# Patient Record
Sex: FEMALE | Race: ASIAN | NOT HISPANIC OR LATINO | Employment: UNEMPLOYED | URBAN - METROPOLITAN AREA
[De-identification: names, ages, dates, MRNs, and addresses within clinical notes are randomized per-mention and may not be internally consistent; named-entity substitution may affect disease eponyms.]

---

## 2021-02-25 PROBLEM — U07.1 COVID-19 VIRUS INFECTION: Status: ACTIVE | Noted: 2021-02-25

## 2021-02-28 PROBLEM — J96.01 ACUTE RESPIRATORY FAILURE WITH HYPOXIA: Status: ACTIVE | Noted: 2021-02-28

## 2021-02-28 PROBLEM — J12.82 PNEUMONIA DUE TO COVID-19 VIRUS: Status: ACTIVE | Noted: 2021-02-25

## 2021-02-28 PROBLEM — E87.6 HYPOKALEMIA: Status: ACTIVE | Noted: 2021-02-28

## 2021-03-02 DIAGNOSIS — U07.1 COVID-19 VIRUS DETECTED: ICD-10-CM

## 2021-03-03 ENCOUNTER — PATIENT OUTREACH (OUTPATIENT)
Dept: ADMINISTRATIVE | Facility: CLINIC | Age: 36
End: 2021-03-03

## 2021-03-17 ENCOUNTER — OFFICE VISIT (OUTPATIENT)
Dept: PRIMARY CARE CLINIC | Facility: CLINIC | Age: 36
End: 2021-03-17
Payer: COMMERCIAL

## 2021-03-17 VITALS
WEIGHT: 127.31 LBS | HEIGHT: 60 IN | BODY MASS INDEX: 25 KG/M2 | DIASTOLIC BLOOD PRESSURE: 70 MMHG | OXYGEN SATURATION: 97 % | HEART RATE: 115 BPM | SYSTOLIC BLOOD PRESSURE: 110 MMHG | TEMPERATURE: 99 F

## 2021-03-17 DIAGNOSIS — J12.82 PNEUMONIA DUE TO COVID-19 VIRUS: ICD-10-CM

## 2021-03-17 DIAGNOSIS — R00.0 TACHYCARDIA: ICD-10-CM

## 2021-03-17 DIAGNOSIS — J96.01 ACUTE RESPIRATORY FAILURE WITH HYPOXIA: Primary | ICD-10-CM

## 2021-03-17 DIAGNOSIS — U07.1 PNEUMONIA DUE TO COVID-19 VIRUS: ICD-10-CM

## 2021-03-17 PROCEDURE — 3008F BODY MASS INDEX DOCD: CPT | Mod: CPTII,S$GLB,, | Performed by: FAMILY MEDICINE

## 2021-03-17 PROCEDURE — 99213 OFFICE O/P EST LOW 20 MIN: CPT | Mod: S$GLB,,, | Performed by: FAMILY MEDICINE

## 2021-03-17 PROCEDURE — 99213 PR OFFICE/OUTPT VISIT, EST, LEVL III, 20-29 MIN: ICD-10-PCS | Mod: S$GLB,,, | Performed by: FAMILY MEDICINE

## 2021-03-17 PROCEDURE — 99999 PR PBB SHADOW E&M-EST. PATIENT-LVL III: ICD-10-PCS | Mod: PBBFAC,,, | Performed by: FAMILY MEDICINE

## 2021-03-17 PROCEDURE — 99999 PR PBB SHADOW E&M-EST. PATIENT-LVL III: CPT | Mod: PBBFAC,,, | Performed by: FAMILY MEDICINE

## 2021-03-17 PROCEDURE — 3008F PR BODY MASS INDEX (BMI) DOCUMENTED: ICD-10-PCS | Mod: CPTII,S$GLB,, | Performed by: FAMILY MEDICINE

## 2021-03-17 RX ORDER — RIBOFLAVIN (VITAMIN B2) 400 MG
400 TABLET ORAL DAILY
COMMUNITY
End: 2023-08-10

## 2021-03-17 RX ORDER — TOPIRAMATE 50 MG/1
50 TABLET, FILM COATED ORAL 2 TIMES DAILY
COMMUNITY
End: 2023-08-10

## 2021-03-17 RX ORDER — DROSPIRENONE AND ETHINYL ESTRADIOL 0.02-3(28)
1 KIT ORAL DAILY
COMMUNITY
End: 2023-08-10

## 2021-03-17 RX ORDER — LANOLIN ALCOHOL/MO/W.PET/CERES
400 CREAM (GRAM) TOPICAL 2 TIMES DAILY
COMMUNITY
End: 2023-08-10

## 2021-04-26 ENCOUNTER — PATIENT MESSAGE (OUTPATIENT)
Dept: RESEARCH | Facility: HOSPITAL | Age: 36
End: 2021-04-26

## 2021-08-25 DIAGNOSIS — Z11.59 NEED FOR HEPATITIS C SCREENING TEST: ICD-10-CM

## 2021-10-05 ENCOUNTER — PATIENT MESSAGE (OUTPATIENT)
Dept: ADMINISTRATIVE | Facility: HOSPITAL | Age: 36
End: 2021-10-05

## 2022-06-07 ENCOUNTER — PATIENT MESSAGE (OUTPATIENT)
Dept: PRIMARY CARE CLINIC | Facility: CLINIC | Age: 37
End: 2022-06-07
Payer: COMMERCIAL

## 2023-05-10 ENCOUNTER — PATIENT OUTREACH (OUTPATIENT)
Dept: ADMINISTRATIVE | Facility: HOSPITAL | Age: 38
End: 2023-05-10
Payer: COMMERCIAL

## 2023-08-10 ENCOUNTER — OFFICE VISIT (OUTPATIENT)
Dept: PRIMARY CARE CLINIC | Facility: CLINIC | Age: 38
End: 2023-08-10
Payer: COMMERCIAL

## 2023-08-10 ENCOUNTER — LAB VISIT (OUTPATIENT)
Dept: PRIMARY CARE CLINIC | Facility: CLINIC | Age: 38
End: 2023-08-10
Payer: COMMERCIAL

## 2023-08-10 VITALS
WEIGHT: 132.25 LBS | HEART RATE: 95 BPM | SYSTOLIC BLOOD PRESSURE: 115 MMHG | HEIGHT: 60 IN | OXYGEN SATURATION: 97 % | BODY MASS INDEX: 25.97 KG/M2 | DIASTOLIC BLOOD PRESSURE: 72 MMHG

## 2023-08-10 DIAGNOSIS — Z00.00 ANNUAL PHYSICAL EXAM: ICD-10-CM

## 2023-08-10 DIAGNOSIS — Z00.00 ANNUAL PHYSICAL EXAM: Primary | ICD-10-CM

## 2023-08-10 DIAGNOSIS — Z86.32 HISTORY OF GESTATIONAL DIABETES: ICD-10-CM

## 2023-08-10 LAB
ALBUMIN SERPL BCP-MCNC: 4.3 G/DL (ref 3.5–5.2)
ALP SERPL-CCNC: 69 U/L (ref 55–135)
ALT SERPL W/O P-5'-P-CCNC: 37 U/L (ref 10–44)
ANION GAP SERPL CALC-SCNC: 14 MMOL/L (ref 8–16)
AST SERPL-CCNC: 30 U/L (ref 10–40)
BASOPHILS # BLD AUTO: 0.04 K/UL (ref 0–0.2)
BASOPHILS NFR BLD: 0.7 % (ref 0–1.9)
BILIRUB SERPL-MCNC: 0.6 MG/DL (ref 0.1–1)
BUN SERPL-MCNC: 13 MG/DL (ref 6–20)
CALCIUM SERPL-MCNC: 10 MG/DL (ref 8.7–10.5)
CHLORIDE SERPL-SCNC: 102 MMOL/L (ref 95–110)
CHOLEST SERPL-MCNC: 219 MG/DL (ref 120–199)
CHOLEST/HDLC SERPL: 5.3 {RATIO} (ref 2–5)
CO2 SERPL-SCNC: 23 MMOL/L (ref 23–29)
CREAT SERPL-MCNC: 0.6 MG/DL (ref 0.5–1.4)
DIFFERENTIAL METHOD: NORMAL
EOSINOPHIL # BLD AUTO: 0.4 K/UL (ref 0–0.5)
EOSINOPHIL NFR BLD: 7.1 % (ref 0–8)
ERYTHROCYTE [DISTWIDTH] IN BLOOD BY AUTOMATED COUNT: 12.4 % (ref 11.5–14.5)
EST. GFR  (NO RACE VARIABLE): >60 ML/MIN/1.73 M^2
ESTIMATED AVG GLUCOSE: 157 MG/DL (ref 68–131)
GLUCOSE SERPL-MCNC: 134 MG/DL (ref 70–110)
HBA1C MFR BLD: 7.1 % (ref 4–5.6)
HCT VFR BLD AUTO: 41 % (ref 37–48.5)
HDLC SERPL-MCNC: 41 MG/DL (ref 40–75)
HDLC SERPL: 18.7 % (ref 20–50)
HGB BLD-MCNC: 13.8 G/DL (ref 12–16)
IMM GRANULOCYTES # BLD AUTO: 0.01 K/UL (ref 0–0.04)
IMM GRANULOCYTES NFR BLD AUTO: 0.2 % (ref 0–0.5)
LDLC SERPL CALC-MCNC: ABNORMAL MG/DL (ref 63–159)
LYMPHOCYTES # BLD AUTO: 2.1 K/UL (ref 1–4.8)
LYMPHOCYTES NFR BLD: 35 % (ref 18–48)
MCH RBC QN AUTO: 29.4 PG (ref 27–31)
MCHC RBC AUTO-ENTMCNC: 33.7 G/DL (ref 32–36)
MCV RBC AUTO: 87 FL (ref 82–98)
MONOCYTES # BLD AUTO: 0.5 K/UL (ref 0.3–1)
MONOCYTES NFR BLD: 8.6 % (ref 4–15)
NEUTROPHILS # BLD AUTO: 2.9 K/UL (ref 1.8–7.7)
NEUTROPHILS NFR BLD: 48.4 % (ref 38–73)
NONHDLC SERPL-MCNC: 178 MG/DL
NRBC BLD-RTO: 0 /100 WBC
PLATELET # BLD AUTO: 251 K/UL (ref 150–450)
PMV BLD AUTO: 10.2 FL (ref 9.2–12.9)
POTASSIUM SERPL-SCNC: 3.7 MMOL/L (ref 3.5–5.1)
PROT SERPL-MCNC: 8.3 G/DL (ref 6–8.4)
RBC # BLD AUTO: 4.7 M/UL (ref 4–5.4)
SODIUM SERPL-SCNC: 139 MMOL/L (ref 136–145)
TRIGL SERPL-MCNC: 905 MG/DL (ref 30–150)
WBC # BLD AUTO: 6.02 K/UL (ref 3.9–12.7)

## 2023-08-10 PROCEDURE — 99395 PR PREVENTIVE VISIT,EST,18-39: ICD-10-PCS | Mod: S$GLB,,, | Performed by: FAMILY MEDICINE

## 2023-08-10 PROCEDURE — 80061 LIPID PANEL: CPT | Performed by: FAMILY MEDICINE

## 2023-08-10 PROCEDURE — 99395 PREV VISIT EST AGE 18-39: CPT | Mod: S$GLB,,, | Performed by: FAMILY MEDICINE

## 2023-08-10 PROCEDURE — 1160F PR REVIEW ALL MEDS BY PRESCRIBER/CLIN PHARMACIST DOCUMENTED: ICD-10-PCS | Mod: CPTII,S$GLB,, | Performed by: FAMILY MEDICINE

## 2023-08-10 PROCEDURE — 3008F BODY MASS INDEX DOCD: CPT | Mod: CPTII,S$GLB,, | Performed by: FAMILY MEDICINE

## 2023-08-10 PROCEDURE — 3078F DIAST BP <80 MM HG: CPT | Mod: CPTII,S$GLB,, | Performed by: FAMILY MEDICINE

## 2023-08-10 PROCEDURE — 3074F SYST BP LT 130 MM HG: CPT | Mod: CPTII,S$GLB,, | Performed by: FAMILY MEDICINE

## 2023-08-10 PROCEDURE — 3078F PR MOST RECENT DIASTOLIC BLOOD PRESSURE < 80 MM HG: ICD-10-PCS | Mod: CPTII,S$GLB,, | Performed by: FAMILY MEDICINE

## 2023-08-10 PROCEDURE — 80053 COMPREHEN METABOLIC PANEL: CPT | Performed by: FAMILY MEDICINE

## 2023-08-10 PROCEDURE — 1159F PR MEDICATION LIST DOCUMENTED IN MEDICAL RECORD: ICD-10-PCS | Mod: CPTII,S$GLB,, | Performed by: FAMILY MEDICINE

## 2023-08-10 PROCEDURE — 85025 COMPLETE CBC W/AUTO DIFF WBC: CPT | Performed by: FAMILY MEDICINE

## 2023-08-10 PROCEDURE — 3008F PR BODY MASS INDEX (BMI) DOCUMENTED: ICD-10-PCS | Mod: CPTII,S$GLB,, | Performed by: FAMILY MEDICINE

## 2023-08-10 PROCEDURE — 99999 PR PBB SHADOW E&M-EST. PATIENT-LVL IV: CPT | Mod: PBBFAC,,, | Performed by: FAMILY MEDICINE

## 2023-08-10 PROCEDURE — 83036 HEMOGLOBIN GLYCOSYLATED A1C: CPT | Performed by: FAMILY MEDICINE

## 2023-08-10 PROCEDURE — 1159F MED LIST DOCD IN RCRD: CPT | Mod: CPTII,S$GLB,, | Performed by: FAMILY MEDICINE

## 2023-08-10 PROCEDURE — 3074F PR MOST RECENT SYSTOLIC BLOOD PRESSURE < 130 MM HG: ICD-10-PCS | Mod: CPTII,S$GLB,, | Performed by: FAMILY MEDICINE

## 2023-08-10 PROCEDURE — 1160F RVW MEDS BY RX/DR IN RCRD: CPT | Mod: CPTII,S$GLB,, | Performed by: FAMILY MEDICINE

## 2023-08-10 PROCEDURE — 99999 PR PBB SHADOW E&M-EST. PATIENT-LVL IV: ICD-10-PCS | Mod: PBBFAC,,, | Performed by: FAMILY MEDICINE

## 2023-08-10 RX ORDER — FERROUS SULFATE 324(65)MG
324 TABLET, DELAYED RELEASE (ENTERIC COATED) ORAL DAILY
COMMUNITY

## 2023-08-10 RX ORDER — CETIRIZINE HYDROCHLORIDE 10 MG/1
10 TABLET ORAL DAILY
COMMUNITY
Start: 2020-08-09

## 2023-08-10 RX ORDER — IBUPROFEN 600 MG/1
800 TABLET ORAL EVERY 6 HOURS
COMMUNITY
Start: 2023-06-16

## 2023-08-11 DIAGNOSIS — E78.1 HYPERTRIGLYCERIDEMIA: ICD-10-CM

## 2023-08-11 DIAGNOSIS — E11.9 TYPE 2 DIABETES MELLITUS WITHOUT COMPLICATION, WITHOUT LONG-TERM CURRENT USE OF INSULIN: Primary | ICD-10-CM

## 2023-08-11 RX ORDER — METFORMIN HYDROCHLORIDE 500 MG/1
500 TABLET, EXTENDED RELEASE ORAL
Qty: 90 TABLET | Refills: 3 | Status: SHIPPED | OUTPATIENT
Start: 2023-08-11

## 2025-05-07 ENCOUNTER — OFFICE VISIT (OUTPATIENT)
Dept: PRIMARY CARE CLINIC | Facility: CLINIC | Age: 40
End: 2025-05-07
Payer: COMMERCIAL

## 2025-05-07 VITALS
SYSTOLIC BLOOD PRESSURE: 165 MMHG | OXYGEN SATURATION: 97 % | BODY MASS INDEX: 26.53 KG/M2 | HEIGHT: 60 IN | WEIGHT: 135.13 LBS | DIASTOLIC BLOOD PRESSURE: 102 MMHG | HEART RATE: 90 BPM

## 2025-05-07 DIAGNOSIS — Z00.00 ANNUAL PHYSICAL EXAM: ICD-10-CM

## 2025-05-07 DIAGNOSIS — L70.0 ACNE VULGARIS: ICD-10-CM

## 2025-05-07 DIAGNOSIS — E11.9 TYPE 2 DIABETES MELLITUS WITHOUT COMPLICATION, WITHOUT LONG-TERM CURRENT USE OF INSULIN: Primary | ICD-10-CM

## 2025-05-07 PROCEDURE — 99999 PR PBB SHADOW E&M-EST. PATIENT-LVL IV: CPT | Mod: PBBFAC,,, | Performed by: FAMILY MEDICINE

## 2025-05-07 RX ORDER — INSULIN ASPART 100 [IU]/ML
INJECTION, SOLUTION INTRAVENOUS; SUBCUTANEOUS
COMMUNITY
End: 2025-05-07

## 2025-05-07 RX ORDER — ONDANSETRON 4 MG/1
8 TABLET, FILM COATED ORAL 2 TIMES DAILY
COMMUNITY

## 2025-05-07 RX ORDER — INSULIN GLARGINE 100 [IU]/ML
INJECTION, SOLUTION SUBCUTANEOUS
Qty: 15 ML | Refills: 3 | Status: SHIPPED | OUTPATIENT
Start: 2025-05-07

## 2025-05-07 RX ORDER — PEN NEEDLE, DIABETIC 30 GX3/16"
1 NEEDLE, DISPOSABLE MISCELLANEOUS
Qty: 100 EACH | Refills: 6 | Status: SHIPPED | OUTPATIENT
Start: 2025-05-07

## 2025-05-07 RX ORDER — INSULIN ASPART 100 [IU]/ML
INJECTION, SOLUTION INTRAVENOUS; SUBCUTANEOUS
Qty: 6 ML | Refills: 3 | Status: SHIPPED | OUTPATIENT
Start: 2025-05-07

## 2025-05-07 RX ORDER — BLOOD-GLUCOSE SENSOR
1 EACH MISCELLANEOUS
Qty: 5 EACH | Refills: 6 | Status: SHIPPED | OUTPATIENT
Start: 2025-05-07

## 2025-05-07 RX ORDER — KETOCONAZOLE 20 MG/G
CREAM TOPICAL DAILY
COMMUNITY

## 2025-05-07 RX ORDER — TAZAROTENE 0.5 MG/G
GEL TOPICAL NIGHTLY
Qty: 30 G | Refills: 1 | Status: SHIPPED | OUTPATIENT
Start: 2025-05-07 | End: 2026-05-07

## 2025-05-07 NOTE — PROGRESS NOTES
Clinic Note  5/7/2025      Subjective:       Patient ID:  Gisella is a 40 y.o. female being seen for:      History of Present Illness    CHIEF COMPLAINT:  Gisella presents today for annual checkup with headache from immunotherapy.    CANCER HISTORY:  She was diagnosed with liver cancer in 2023 after presenting to the ER with abdominal pain. She underwent tumor resection and cholecystectomy in 2024, followed by direct chemotherapy and microwave ablation which removed 11 tumors with 3 remaining. She initiated immunotherapy yesterday. The etiology of her liver cancer has been attributed to prolonged estrogen exposure from birth control use since age 18. She was advised to discontinue birth control pills and avoid future pregnancies due to estrogen-related health risks.    DIABETES:  She has been on insulin since June 2024, currently taking Lantus 15 units in the morning and 17 units at night. She uses pre-meal insulin starting at 5 units with 1 unit adjustments based on blood sugar. She uses a Dexcom continuous glucose monitor. She reports better glycemic control with meals consisting of steak and vegetables (broccoli, asparagus, mushrooms), while fruits cause significant blood sugar elevation.    DERMATOLOGIC:  She reports ongoing acne concerns with continued presence of pimples despite current treatment regimen.    SOCIAL HISTORY:  She has one child who is almost two years old.      ROS:  General: -fever, -chills, -fatigue, -weight gain, -weight loss  Eyes: -vision changes, -redness, -discharge  ENT: -ear pain, -nasal congestion, -sore throat  Cardiovascular: -chest pain, -palpitations, -lower extremity edema  Respiratory: -cough, -shortness of breath  Gastrointestinal: -abdominal pain, -nausea, -vomiting, -diarrhea, -constipation, -blood in stool  Genitourinary: -dysuria, -hematuria, -frequency  Musculoskeletal: -joint pain, -muscle pain  Skin: -rash, -lesion, +acne  Neurological: +headache, -dizziness, -numbness,  "-tingling  Psychiatric: -anxiety, -depression, -sleep difficulty  Head: +head pain           Medication List with Changes/Refills   New Medications    BLOOD-GLUCOSE SENSOR (DEXCOM G7 SENSOR) SHMUEL    1 each by Misc.(Non-Drug; Combo Route) route 4 (four) times daily before meals and nightly.    INSULIN ASPART U-100 (NOVOLOG FLEXPEN U-100 INSULIN) 100 UNIT/ML (3 ML) INPN PEN    Inject 6 units + sliding scale three times a day with meals    INSULIN GLARGINE U-100, LANTUS, (LANTUS SOLOSTAR U-100 INSULIN) 100 UNIT/ML (3 ML) INPN PEN    Take 18 units in the morning and 20 units at night    PEN NEEDLE, DIABETIC 31 GAUGE X 5/16" NDLE    1 each by Misc.(Non-Drug; Combo Route) route 5 (five) times daily.    TAZAROTENE (TAZORAC) 0.05 % GEL    Apply topically every evening.   Current Medications    CETIRIZINE (ZYRTEC) 10 MG TABLET    Take 10 mg by mouth once daily.    DIETARY SUPPLEMENT ORAL    1,200 mg. Sunflower Lecithin 1-4 daily    DIETARY SUPPLEMENT ORAL    Liquid gold for milk production 2 capsules, 3 times daily    FERROUS SULFATE 324 MG (65 MG IRON) TBEC    Take 324 mg by mouth once daily.    IBUPROFEN (ADVIL,MOTRIN) 600 MG TABLET    Take 800 mg by mouth every 6 (six) hours.    INV FOLATE 400 MCG TABLET    Take 400 mcg by mouth once daily.    KETOCONAZOLE (NIZORAL) 2 % CREAM    Apply topically once daily.    ONDANSETRON (ZOFRAN) 4 MG TABLET    Take 8 mg by mouth 2 (two) times daily.    PRENATAL VIT 91/IRON/FOLIC/DHA (PRENATAL + DHA ORAL)    Take 2 each by mouth once daily.   Discontinued Medications    INSULIN ASPART U-100 (NOVOLOG) 100 UNIT/ML INJECTION    Inject into the skin 3 (three) times daily before meals.    METFORMIN (GLUCOPHAGE-XR) 500 MG ER 24HR TABLET    Take 1 tablet (500 mg total) by mouth daily with breakfast.       Problem List[1]        Objective:      BP (!) 165/102   Pulse 90   Ht 5' (1.524 m)   Wt 61.3 kg (135 lb 2.3 oz)   LMP 04/29/2025   SpO2 97%   BMI 26.39 kg/m²       Physical Exam  "   Vitals: Blood pressure: 138/87.  General: No acute distress. Well-developed. Well-nourished.  Eyes: EOMI. Sclerae anicteric.  HENT: Normocephalic. Atraumatic. Nares patent. Moist oral mucosa.  Cardiovascular: Regular rate. Regular rhythm. No murmurs. No rubs. No gallops. Normal S1, S2.  Respiratory: Normal respiratory effort. Clear to auscultation bilaterally. No rales. No rhonchi. No wheezing.  Musculoskeletal: No  obvious deformity.  Extremities: No lower extremity edema.  Neurological: Alert & oriented x3. No slurred speech. Normal gait.  Psychiatric: Normal mood. Normal affect. Good insight. Good judgment.  Skin: Warm. Dry. No rash.               Assessment and Plan:     Assessment & Plan    - Assessed diabetes management, noting poor glycemic control with average glucose of 224 mg/dL over past 30 days and only 22% time in range.  - Evaluated thyroid function, noting subclinical hypothyroidism requiring monitoring.  - Reviewed BP, determining elevated reading likely due to headache from recent immunotherapy.  - Discussed thyroid function monitoring and potential future need for hormone replacement if TSH exceeds 10.    LIVER CANCER:  - Monitored the patient's condition following immunotherapy yesterday, noting headache as a side effect.  - Confirmed liver functions are within acceptable range.  - Gisella has history of liver cancer with gallbladder removal surgery last year.  - Residual masses were biopsied and monitored post-surgery.  - Initially, liver cancer was not detected in residual masses, but additional masses were found after 3 months.  - Treatment has included direct chemotherapy procedure, ablation microwave procedure to remove masses, and ongoing immunotherapy.    TYPE 2 DIABETES WITH HYPERGLYCEMIA:  - Monitored glucose, noting an average of 224 over the last 30 days with only 22% in target range.  - Morning blood sugar ranges from 180-200 and remains elevated throughout the day.  - Urinalysis shows  "significant glycosuria.  - Current regimen includes Dexcom and Lantus.  - Due to poor control, increasing Lantus from 15 units to 18 units in the morning and from 17 units to 20 units at night.  - Increasing NovoLog to 6 units plus sliding scale before meals.  - Educated patient on optimal food consumption order: vegetables first, then protein, followed by carbohydrates and sugars to minimize glucose spikes.  - Discussed impact of different fruits on glucose levels and recommended alternative snacks such as nuts, plain yogurt, or vegetables before consuming apples as a midnight snack.  - Ordered Hemoglobin A1C test and sent updated prescriptions to pharmacy.    ACNE:  - Prescribed Tazarotene 0.05% gel for acne treatment as requested by the patient.    GALLBLADDER REMOVAL:  - Documented patient's history of gallbladder removal during surgery to access liver tumor.    FOLLOW-UP:  - Scheduled follow up in 7 weeks for potential insulin adjustments.  - Ordered Cholesterol level.  - Gisella advised to contact office if BP remains consistently elevated.         Follow Up:   Follow up in about 7 weeks (around 6/25/2025) for follow-up.    Other Orders Placed This Visit:  Orders Placed This Encounter    Hemoglobin A1C    Lipid Panel    insulin glargine U-100, Lantus, (LANTUS SOLOSTAR U-100 INSULIN) 100 unit/mL (3 mL) InPn pen    insulin aspart U-100 (NOVOLOG FLEXPEN U-100 INSULIN) 100 unit/mL (3 mL) InPn pen    pen needle, diabetic 31 gauge x 5/16" Ndle    blood-glucose sensor (DEXCOM G7 SENSOR) Marilee    tazarotene (TAZORAC) 0.05 % gel         Casey Peralta MD        This note is dictated on M*Modal word recognition program and This note was generated with the assistance of ambient listening technology. Verbal consent was obtained by the patient and accompanying visitor(s) for the recording of patient appointment to facilitate this note. I attest to having reviewed and edited the generated note for accuracy, though some syntax " or spelling errors may persist. Please contact the author of this note for any clarification.  .  There are word recognition mistakes that are occasionally missed on review.         [1]   Patient Active Problem List  Diagnosis    Pneumonia due to COVID-19 virus    Acute respiratory failure with hypoxia    Hypokalemia

## 2025-05-08 ENCOUNTER — LAB VISIT (OUTPATIENT)
Dept: PRIMARY CARE CLINIC | Facility: CLINIC | Age: 40
End: 2025-05-08
Payer: COMMERCIAL

## 2025-05-08 DIAGNOSIS — E11.9 TYPE 2 DIABETES MELLITUS WITHOUT COMPLICATION, WITHOUT LONG-TERM CURRENT USE OF INSULIN: ICD-10-CM

## 2025-05-08 DIAGNOSIS — Z00.00 ANNUAL PHYSICAL EXAM: ICD-10-CM

## 2025-05-08 LAB
CHOLEST SERPL-MCNC: 201 MG/DL (ref 120–199)
CHOLEST/HDLC SERPL: 3.9 {RATIO} (ref 2–5)
EAG (OHS): 174 MG/DL (ref 68–131)
HBA1C MFR BLD: 7.7 % (ref 4–5.6)
HDLC SERPL-MCNC: 51 MG/DL (ref 40–75)
HDLC SERPL: 25.4 % (ref 20–50)
LDLC SERPL CALC-MCNC: 103.6 MG/DL (ref 63–159)
NONHDLC SERPL-MCNC: 150 MG/DL
TRIGL SERPL-MCNC: 232 MG/DL (ref 30–150)

## 2025-05-08 PROCEDURE — 80061 LIPID PANEL: CPT

## 2025-05-08 PROCEDURE — 83036 HEMOGLOBIN GLYCOSYLATED A1C: CPT

## 2025-05-10 ENCOUNTER — RESULTS FOLLOW-UP (OUTPATIENT)
Dept: PRIMARY CARE CLINIC | Facility: CLINIC | Age: 40
End: 2025-05-10

## 2025-05-15 DIAGNOSIS — E11.9 TYPE 2 DIABETES MELLITUS WITHOUT COMPLICATION: ICD-10-CM

## 2025-05-15 DIAGNOSIS — E11.9 TYPE 2 DIABETES MELLITUS WITHOUT COMPLICATION, UNSPECIFIED WHETHER LONG TERM INSULIN USE: ICD-10-CM

## 2025-06-23 ENCOUNTER — OFFICE VISIT (OUTPATIENT)
Dept: PRIMARY CARE CLINIC | Facility: CLINIC | Age: 40
End: 2025-06-23
Payer: COMMERCIAL

## 2025-06-23 VITALS
TEMPERATURE: 98 F | DIASTOLIC BLOOD PRESSURE: 97 MMHG | BODY MASS INDEX: 26.25 KG/M2 | HEART RATE: 75 BPM | WEIGHT: 133.69 LBS | HEIGHT: 60 IN | SYSTOLIC BLOOD PRESSURE: 146 MMHG | OXYGEN SATURATION: 98 %

## 2025-06-23 DIAGNOSIS — E11.9 TYPE 2 DIABETES MELLITUS WITHOUT COMPLICATION, WITHOUT LONG-TERM CURRENT USE OF INSULIN: ICD-10-CM

## 2025-06-23 DIAGNOSIS — R03.0 ELEVATED BLOOD PRESSURE READING WITHOUT DIAGNOSIS OF HYPERTENSION: ICD-10-CM

## 2025-06-23 DIAGNOSIS — R51.9 NONINTRACTABLE HEADACHE, UNSPECIFIED CHRONICITY PATTERN, UNSPECIFIED HEADACHE TYPE: ICD-10-CM

## 2025-06-23 DIAGNOSIS — Z00.00 ANNUAL PHYSICAL EXAM: Primary | ICD-10-CM

## 2025-06-23 PROCEDURE — 99214 OFFICE O/P EST MOD 30 MIN: CPT | Mod: S$GLB,,, | Performed by: FAMILY MEDICINE

## 2025-06-23 PROCEDURE — 3051F HG A1C>EQUAL 7.0%<8.0%: CPT | Mod: CPTII,S$GLB,, | Performed by: FAMILY MEDICINE

## 2025-06-23 PROCEDURE — 99999 PR PBB SHADOW E&M-EST. PATIENT-LVL V: CPT | Mod: PBBFAC,,, | Performed by: FAMILY MEDICINE

## 2025-06-23 PROCEDURE — 3008F BODY MASS INDEX DOCD: CPT | Mod: CPTII,S$GLB,, | Performed by: FAMILY MEDICINE

## 2025-06-23 PROCEDURE — 3077F SYST BP >= 140 MM HG: CPT | Mod: CPTII,S$GLB,, | Performed by: FAMILY MEDICINE

## 2025-06-23 PROCEDURE — 3080F DIAST BP >= 90 MM HG: CPT | Mod: CPTII,S$GLB,, | Performed by: FAMILY MEDICINE

## 2025-06-23 PROCEDURE — 1159F MED LIST DOCD IN RCRD: CPT | Mod: CPTII,S$GLB,, | Performed by: FAMILY MEDICINE

## 2025-06-23 RX ORDER — MINOCYCLINE 40 MG/G
AEROSOL, FOAM TOPICAL
COMMUNITY
Start: 2025-06-13

## 2025-06-23 RX ORDER — HYDROCODONE BITARTRATE AND ACETAMINOPHEN 5; 325 MG/1; MG/1
1 TABLET ORAL EVERY 6 HOURS PRN
COMMUNITY
Start: 2025-06-18 | End: 2025-07-18

## 2025-06-23 RX ORDER — BUTALBITAL, ACETAMINOPHEN AND CAFFEINE 50; 325; 40 MG/1; MG/1; MG/1
1 TABLET ORAL EVERY 6 HOURS PRN
COMMUNITY
Start: 2025-05-21 | End: 2025-06-23 | Stop reason: SDUPTHER

## 2025-06-23 RX ORDER — BUTALBITAL, ACETAMINOPHEN AND CAFFEINE 50; 325; 40 MG/1; MG/1; MG/1
1 TABLET ORAL EVERY 6 HOURS PRN
Qty: 30 TABLET | Refills: 4 | Status: SHIPPED | OUTPATIENT
Start: 2025-06-23 | End: 2025-06-23 | Stop reason: ALTCHOICE

## 2025-06-23 RX ORDER — IVERMECTIN 10 MG/G
CREAM TOPICAL
COMMUNITY
Start: 2025-06-05

## 2025-06-23 RX ORDER — BUTALBITAL, ACETAMINOPHEN AND CAFFEINE 50; 325; 40 MG/1; MG/1; MG/1
1 TABLET ORAL EVERY 6 HOURS PRN
Qty: 30 TABLET | Refills: 4 | Status: SHIPPED | OUTPATIENT
Start: 2025-06-23

## 2025-06-23 RX ORDER — CETIRIZINE HYDROCHLORIDE 10 MG/1
CAPSULE, LIQUID FILLED ORAL
COMMUNITY

## 2025-06-23 RX ORDER — INSULIN ASPART 100 [IU]/ML
INJECTION, SOLUTION INTRAVENOUS; SUBCUTANEOUS
Qty: 6 ML | Refills: 3 | Status: SHIPPED | OUTPATIENT
Start: 2025-06-23

## 2025-06-23 RX ORDER — INSULIN GLARGINE 100 [IU]/ML
INJECTION, SOLUTION SUBCUTANEOUS
Qty: 15 ML | Refills: 4 | Status: SHIPPED | OUTPATIENT
Start: 2025-06-23

## 2025-06-23 RX ORDER — ROFLUMILAST 3 MG/G
AEROSOL, FOAM TOPICAL
COMMUNITY
Start: 2025-06-05

## 2025-06-23 RX ORDER — OXYCODONE HYDROCHLORIDE 5 MG/1
5 TABLET ORAL EVERY 4 HOURS PRN
COMMUNITY
Start: 2025-04-29

## 2025-06-23 NOTE — PATIENT INSTRUCTIONS
Lantus increase to 22 units in the morning and 24 units at night  Novolog increase to 6 units + sliding scale

## 2025-06-23 NOTE — PROGRESS NOTES
Clinic Note  6/23/2025      Subjective:       Patient ID:  Gisella is a 40 y.o. female being seen for:      History of Present Illness    CHIEF COMPLAINT:  Gisella presents today for follow up of diabetes management.    DIABETES MANAGEMENT:  Dexcom report shows blood sugar fluctuations with morning averages 190-200 and post-meal dinner spikes reaching 300s. HbA1c 7.7, average blood sugar of 174. Current insulin regimen includes Lantus 18 units morning and 20 units evening, with Novolog 6units + SSI tid with meals.  She reports challenges with consistent insulin administration due to irregular eating patterns with small meals throughout the day due to work schedule. She expresses interest in insulin pump therapy to accommodate non-traditional eating schedule and improve glycemic control. She reports 10-pound weight gain following surgery last year, which has not resolved, and understands insulin's contribution to weight gain.    ONCOLOGY:  She received chemotherapy treatment last week with acceptable lab values for continued treatment. She received medication refill from oncologist with noted prescription discrepancy. She developed lymphadenopathy and was directed to emergency department by her oncologist, where she was diagnosed with mononucleosis.    PODIATRIC:  She reports history of recurrent R ingrown toenail requiring multiple procedures, currently on third removal involving both sides of R toe Despite previous interventions, the toenail continues to grow back abnormally. She previously self-managed by manually removing problematic nail growth but is currently refraining from this practice.      ROS:  General: -fever, -chills, -fatigue, +weight gain, -weight loss  Eyes: -vision changes, -redness, -discharge  ENT: -ear pain, -nasal congestion, -sore throat  Cardiovascular: -chest pain, -palpitations, -lower extremity edema  Respiratory: -cough, -shortness of breath  Gastrointestinal: -abdominal pain, -nausea,  "-vomiting, -diarrhea, -constipation, -blood in stool  Genitourinary: -dysuria, -hematuria, -frequency  Musculoskeletal: -joint pain, -muscle pain  Skin: -rash, -lesion  Neurological: -headache, -dizziness, -numbness, -tingling  Psychiatric: -anxiety, -depression, -sleep difficulty  Lymphatics: +swollen lymph nodes           Medication List with Changes/Refills   Current Medications    AMZEEQ 4 % FOAM    SMARTSIG:sparingly Topical Every Night    BLOOD-GLUCOSE SENSOR (DEXCOM G7 SENSOR) SHMUEL    1 each by Misc.(Non-Drug; Combo Route) route 4 (four) times daily before meals and nightly.    CETIRIZINE (ZYRTEC) 10 MG CAP    ZyrTEC    CETIRIZINE (ZYRTEC) 10 MG TABLET    Take 10 mg by mouth once daily.    HYDROCODONE-ACETAMINOPHEN (NORCO) 5-325 MG PER TABLET    Take 1 tablet by mouth every 6 (six) hours as needed.    IBUPROFEN (ADVIL,MOTRIN) 600 MG TABLET    Take 800 mg by mouth every 6 (six) hours.    KETOCONAZOLE (NIZORAL) 2 % CREAM    Apply topically once daily.    MV,CA,MIN/IRON/FA/GUARANA/CAFF (ONE-A-DAY WOMEN'S ACTIVE ORAL)        ONDANSETRON (ZOFRAN) 4 MG TABLET    Take 8 mg by mouth 2 (two) times daily.    OXYCODONE (ROXICODONE) 5 MG IMMEDIATE RELEASE TABLET    Take 5 mg by mouth every 4 (four) hours as needed.    PEN NEEDLE, DIABETIC 31 GAUGE X 5/16" NDLE    1 each by Misc.(Non-Drug; Combo Route) route 5 (five) times daily.    SOOLANTRA 1 % CREA    SMARTSIG:sparingly Topical Every Night    TAZAROTENE (TAZORAC) 0.05 % GEL    Apply topically every evening.    ZORYVE 0.3 % FOAM    Apply topically.   Changed and/or Refilled Medications    Modified Medication Previous Medication    BUTALBITAL-ACETAMINOPHEN-CAFFEINE -40 MG (FIORICET, ESGIC) -40 MG PER TABLET butalbital-acetaminophen-caffeine -40 mg (FIORICET, ESGIC) -40 mg per tablet       Take 1 tablet by mouth every 6 (six) hours as needed for Headaches.    Take 1 tablet by mouth every 6 (six) hours as needed.    INSULIN ASPART U-100 (NOVOLOG " FLEXPEN U-100 INSULIN) 100 UNIT/ML (3 ML) INPN PEN insulin aspart U-100 (NOVOLOG FLEXPEN U-100 INSULIN) 100 unit/mL (3 mL) InPn pen       Inject 7 units + sliding scale three times a day with meals    Inject 6 units + sliding scale three times a day with meals    INSULIN GLARGINE U-100, LANTUS, (LANTUS SOLOSTAR U-100 INSULIN) 100 UNIT/ML (3 ML) INPN PEN insulin glargine U-100, Lantus, (LANTUS SOLOSTAR U-100 INSULIN) 100 unit/mL (3 mL) InPn pen       Take 22 units in the morning and 24 units at night    Take 18 units in the morning and 20 units at night   Discontinued Medications    DIETARY SUPPLEMENT ORAL    1,200 mg. Sunflower Lecithin 1-4 daily    DIETARY SUPPLEMENT ORAL    Liquid gold for milk production 2 capsules, 3 times daily    FERROUS SULFATE 324 MG (65 MG IRON) TBEC    Take 324 mg by mouth once daily.    INV FOLATE 400 MCG TABLET    Take 400 mcg by mouth once daily.    PRENATAL VIT 91/IRON/FOLIC/DHA (PRENATAL + DHA ORAL)    Take 2 each by mouth once daily.       Problem List[1]        Objective:      BP (!) 146/97   Pulse 75   Temp 97.8 °F (36.6 °C) (Oral)   Ht 5' (1.524 m)   Wt 60.6 kg (133 lb 11.3 oz)   SpO2 98%   BMI 26.11 kg/m²       Physical Exam    Vitals: Blood pressure elevated.  General: No acute distress. Well-developed. Well-nourished.  Eyes: EOMI. Sclerae anicteric.  HENT: Normocephalic. Atraumatic. Nares patent. Moist oral mucosa.  Cardiovascular: Regular rate. Regular rhythm. No murmurs. No rubs. No gallops. Normal S1, S2.  Respiratory: Normal respiratory effort. Clear to auscultation bilaterally. No rales. No rhonchi. No wheezing.  Musculoskeletal: No  obvious deformity.  Extremities: No lower extremity edema.  Neurological: Alert & oriented x3. No slurred speech. Normal gait.  Psychiatric: Normal mood. Normal affect. Good insight. Good judgment.  Skin: Warm. Dry. No rash.                 Assessment and Plan:     Assessment & Plan    - Assessed BP, noting it was higher than usual.  -  Evaluated glucose control, noting average glucose increased from 224 to 239 over last 30 days.  - Determined irregular eating schedule and frequent snacking contribute to poor glycemic control.  - Assessed insulin pump therapy would benefit patient due to high insulin requirements and irregular eating patterns.    TYPE 2 DIABETES MELLITUS:  - Monitored the patient's glucose levels, noting an average of 239 over the last 30 days (previously 224), with spikes to 300's after dinner  - Blood glucose remains elevated despite increased insulin therapy.  - Gisella is currently on a high amount of insulin for their size.  - Explained insulin can contribute to weight gain as it allows the body to utilize glucose more effectively.  - Discussed benefits of insulin pump therapy for patients with irregular eating schedules and high insulin requirements, including continuous insulin delivery and integration with Dexcom.  - Referred the patient to diabetes specialist Quita Herr in [place] for potential insulin pump therapy and comprehensive diabetes management.    LONG TERM USE OF INSULIN:  - Increased Lantus from current regimen (18 units morning/20 units night) to 22 units in the morning and 24 units at night.  - Increased Novolog to 7 units with sliding scale.    OTHER CONDITIONS:  - Started Fioricet 30 pills with refills, instructed not to take more than 7 days per month.         Follow Up:   Follow up in about 2 months (around 8/23/2025) for follow-up.    Other Orders Placed This Visit:  Orders Placed This Encounter    Ambulatory referral/consult to Diabetic Advanced Practice Providers (Medical Management)    butalbital-acetaminophen-caffeine -40 mg (FIORICET, ESGIC) -40 mg per tablet    insulin glargine U-100, Lantus, (LANTUS SOLOSTAR U-100 INSULIN) 100 unit/mL (3 mL) InPn pen    insulin aspart U-100 (NOVOLOG FLEXPEN U-100 INSULIN) 100 unit/mL (3 mL) InPn pen         Casey Peralta MD        This note is  dictated on M*Modal word recognition program and This note was generated with the assistance of ambient listening technology. Verbal consent was obtained by the patient and accompanying visitor(s) for the recording of patient appointment to facilitate this note. I attest to having reviewed and edited the generated note for accuracy, though some syntax or spelling errors may persist. Please contact the author of this note for any clarification.  .  There are word recognition mistakes that are occasionally missed on review.         [1]   Patient Active Problem List  Diagnosis    Pneumonia due to COVID-19 virus    Acute respiratory failure with hypoxia    Hypokalemia

## 2025-07-09 DIAGNOSIS — L70.0 ACNE VULGARIS: ICD-10-CM

## 2025-07-09 RX ORDER — TAZAROTENE 0.5 MG/G
GEL TOPICAL NIGHTLY
Qty: 30 G | Refills: 1 | Status: SHIPPED | OUTPATIENT
Start: 2025-07-09 | End: 2026-07-09

## 2025-07-21 ENCOUNTER — TELEPHONE (OUTPATIENT)
Dept: DIABETES | Facility: CLINIC | Age: 40
End: 2025-07-21
Payer: COMMERCIAL

## 2025-07-22 ENCOUNTER — OFFICE VISIT (OUTPATIENT)
Dept: DIABETES | Facility: CLINIC | Age: 40
End: 2025-07-22
Payer: COMMERCIAL

## 2025-07-22 VITALS
SYSTOLIC BLOOD PRESSURE: 141 MMHG | BODY MASS INDEX: 27.19 KG/M2 | HEIGHT: 60 IN | HEART RATE: 99 BPM | OXYGEN SATURATION: 98 % | WEIGHT: 138.5 LBS | DIASTOLIC BLOOD PRESSURE: 99 MMHG

## 2025-07-22 DIAGNOSIS — O24.419 GESTATIONAL DIABETES MELLITUS (GDM), ANTEPARTUM, GESTATIONAL DIABETES METHOD OF CONTROL UNSPECIFIED: ICD-10-CM

## 2025-07-22 DIAGNOSIS — E11.65 TYPE 2 DIABETES MELLITUS WITH HYPERGLYCEMIA, WITH LONG-TERM CURRENT USE OF INSULIN: Primary | ICD-10-CM

## 2025-07-22 DIAGNOSIS — C22.0 HEPATOCELLULAR CARCINOMA: ICD-10-CM

## 2025-07-22 DIAGNOSIS — E66.3 OVERWEIGHT (BMI 25.0-29.9): ICD-10-CM

## 2025-07-22 DIAGNOSIS — Z79.4 TYPE 2 DIABETES MELLITUS WITH HYPERGLYCEMIA, WITH LONG-TERM CURRENT USE OF INSULIN: Primary | ICD-10-CM

## 2025-07-22 DIAGNOSIS — E11.9 TYPE 2 DIABETES MELLITUS WITHOUT COMPLICATION, WITHOUT LONG-TERM CURRENT USE OF INSULIN: ICD-10-CM

## 2025-07-22 DIAGNOSIS — Q45.3 PANCREAS DIVISUM OF NATIVE PANCREAS: ICD-10-CM

## 2025-07-22 DIAGNOSIS — K76.0 FATTY LIVER: ICD-10-CM

## 2025-07-22 DIAGNOSIS — Z91.89 LESION OF LIVER WITH HIGH RISK FOR MALIGNANT NEOPLASM: ICD-10-CM

## 2025-07-22 DIAGNOSIS — K76.9 LESION OF LIVER WITH HIGH RISK FOR MALIGNANT NEOPLASM: ICD-10-CM

## 2025-07-22 PROBLEM — F90.9 ADHD (ATTENTION DEFICIT HYPERACTIVITY DISORDER): Status: ACTIVE | Noted: 2025-07-22

## 2025-07-22 PROBLEM — J96.01 ACUTE RESPIRATORY FAILURE WITH HYPOXIA: Status: RESOLVED | Noted: 2021-02-28 | Resolved: 2025-07-22

## 2025-07-22 PROBLEM — K31.89 DUODENAL MASS: Status: ACTIVE | Noted: 2024-10-01

## 2025-07-22 PROBLEM — K52.831 COLLAGENOUS COLITIS: Status: ACTIVE | Noted: 2019-04-11

## 2025-07-22 PROCEDURE — 1160F RVW MEDS BY RX/DR IN RCRD: CPT | Mod: CPTII,S$GLB,, | Performed by: NURSE PRACTITIONER

## 2025-07-22 PROCEDURE — 99205 OFFICE O/P NEW HI 60 MIN: CPT | Mod: S$GLB,,, | Performed by: NURSE PRACTITIONER

## 2025-07-22 PROCEDURE — 3008F BODY MASS INDEX DOCD: CPT | Mod: CPTII,S$GLB,, | Performed by: NURSE PRACTITIONER

## 2025-07-22 PROCEDURE — 3080F DIAST BP >= 90 MM HG: CPT | Mod: CPTII,S$GLB,, | Performed by: NURSE PRACTITIONER

## 2025-07-22 PROCEDURE — 99999 PR PBB SHADOW E&M-EST. PATIENT-LVL V: CPT | Mod: PBBFAC,,, | Performed by: NURSE PRACTITIONER

## 2025-07-22 PROCEDURE — 3051F HG A1C>EQUAL 7.0%<8.0%: CPT | Mod: CPTII,S$GLB,, | Performed by: NURSE PRACTITIONER

## 2025-07-22 PROCEDURE — 3077F SYST BP >= 140 MM HG: CPT | Mod: CPTII,S$GLB,, | Performed by: NURSE PRACTITIONER

## 2025-07-22 PROCEDURE — 82043 UR ALBUMIN QUANTITATIVE: CPT | Performed by: FAMILY MEDICINE

## 2025-07-22 PROCEDURE — 1159F MED LIST DOCD IN RCRD: CPT | Mod: CPTII,S$GLB,, | Performed by: NURSE PRACTITIONER

## 2025-07-22 PROCEDURE — 95251 CONT GLUC MNTR ANALYSIS I&R: CPT | Mod: S$GLB,,, | Performed by: NURSE PRACTITIONER

## 2025-07-22 RX ORDER — CLINDAMYCIN PHOSPHATE 10 UG/ML
LOTION TOPICAL
COMMUNITY
Start: 2025-07-02

## 2025-07-22 RX ORDER — INSULIN ASPART 100 [IU]/ML
INJECTION, SOLUTION INTRAVENOUS; SUBCUTANEOUS
Qty: 15 ML | Refills: 11 | Status: SHIPPED | OUTPATIENT
Start: 2025-07-22

## 2025-07-22 RX ORDER — INSULIN GLARGINE 300 [IU]/ML
30 INJECTION, SOLUTION SUBCUTANEOUS DAILY
Qty: 4.5 ML | Refills: 11 | Status: SHIPPED | OUTPATIENT
Start: 2025-07-22 | End: 2026-07-22

## 2025-07-22 NOTE — PATIENT INSTRUCTIONS
Stop the Lantus   Change to Toujeo just ONCE A DAY   Start with Toujeo 30 units daily     Adjust the Novolog to 10 units before meals   If you skip a meal skip the Novolog     With using correction scale: before meals only - 4 hours since eating   150-200: +1 unit  201-250: +2 units  251-300: +3 units  301-350: +4 units  >350: +5 units    Continue the dexcom G7     Meet with laura to discuss pump options

## 2025-07-22 NOTE — PROGRESS NOTES
"  CC:   Chief Complaint   Patient presents with    Diabetes Mellitus       HPI: Gisella Jaramillo is a 40 y.o. female presents for an initial visit today for the management of T2DM    She was diagnosed with Type 2 diabetes in 2023 placed on Metformin 500mg BID. Started on insulin November of 2024 and started on insulin.     Family hx of diabetes? Yes, T2DM- father, sister, brother  Hospitalized for diabetes? No  Insulin therapy? Yes---October of 2024      No personal or FH of thyroid cancer or personal of pancreatic cancer or pancreatitis.  Of note, went to ED with complaints of stomach pain October 2024. Shortly after this, she was started on insulin therapy.       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    There is inflammatory changes around the pancreatic head and duodenum.  The differential diagnosis includes duodenitis or pancreatitis.  There is no evidence for bowel perforation or pancreatic necrosis.   2.   Enlarged, fatty liver.  There are multiple masses within the liver, suspicious for metastasis.  Further evaluation is recommended.     MRI of the abdomen   4.   Partial annular pancreas in association with pancreatic head signal heterogeneity. Recommend further assessment of the EUS.         Currently following with Oncology and receiving treatment for hepatocellular carcinoma  Receiving chemotherapy every 3 weeks, started 4 months ago. Scan scheduled for September 4th to see immunotherapy progress for liver carcinoma. Long term plan is to get liver transplant.       No steroid pills when she receives chemo   Numbers are high following chemo  Likely getting steroids with chemo IV                     DIABETES COMPLICATIONS: none    + proteinuria       Diabetes Management Status    ASA:  No    Statin: Not taking  ACE/ARB: Not taking    Screening or Prevention Patient's value Goal Complete/Controlled?   HgA1C Testing and Control   Lab " Results   Component Value Date    HGBA1C 7.7 (H) 05/08/2025      Annually/Less than 8% Yes   Lipid profile : 05/08/2025 Annually Yes   LDL control Lab Results   Component Value Date    LDLCALC 103.6 05/08/2025    Annually/Less than 100 mg/dl  No   Nephropathy screening Lab Results   Component Value Date    LABMICR 1,690.0 07/22/2025     Lab Results   Component Value Date    PROTEINUA 2+ (A) 02/25/2021    Annually No   Blood pressure BP Readings from Last 1 Encounters:   07/22/25 (!) 141/99    Less than 140/90 No   Dilated retinal exam Most Recent Eye Exam Date: Not Found Annually Yes   Foot exam   : 07/22/2025 Annually No       CURRENT A1C:    Hemoglobin A1C   Date Value Ref Range Status   08/10/2023 7.1 (H) 4.0 - 5.6 % Final     Comment:     ADA Screening Guidelines:  5.7-6.4%  Consistent with prediabetes  >or=6.5%  Consistent with diabetes    High levels of fetal hemoglobin interfere with the HbA1C  assay. Heterozygous hemoglobin variants (HbS, HgC, etc)do  not significantly interfere with this assay.   However, presence of multiple variants may affect accuracy.       Hemoglobin A1c   Date Value Ref Range Status   05/08/2025 7.7 (H) 4.0 - 5.6 % Final     Comment:     ADA Screening Guidelines:  5.7-6.4%  Consistent with prediabetes  >=6.5%  Consistent with diabetes    High levels of fetal hemoglobin interfere with the HbA1C  assay. Heterozygous hemoglobin variants (HbS, HgC, etc)do  not significantly interfere with this assay.   However, presence of multiple variants may affect accuracy.       GOAL A1C:  6.5%-7% without hypoglycemia      DM MEDICATIONS USED IN THE PAST:  Lantus and NovoLog   Dexcom G7   Metformin         CURRENT DIABETES MEDICATIONS:  Lantus 22 units daily in the morning and 24 units nightly  Novolog 7 units t.i.d. a.c.---sometimes taking 7 units more than 3 times a day if she is eating more than 3 times per day  Insulin:  pens.    Missed doses: denies         BLOOD GLUCOSE MONITORING:    Sensor  type: dexcom G7   Average BG readin   Time in range: 36%  42% high  22% very high  0% low  0% very low  Estimated A1c 8.3%  Site change: q10 days      2 weeks prior--average blood sugar 221  28% in range  42% high, 30% very high  Estimated A1c 8.  6%      Sensor was downloaded in clinic today and reviewed with patient.   Please see attached document for download.         HYPOGLYCEMIA:  No- 90/92.  0% low, 0% very low  Glucagon kit:  Medic alert bracelet:      MEALS: eating 3 meals per day   BF: skips  Lunch: baked chicken, fish, steak, soup  Dinner: same as lunch  Snack: nuts, beef jerky sticks, fruits  Drinks:water, coke zero, unsweet matcha       CURRENT EXERCISE:  Yes- treadmill, but inconsistent      Review of Systems  Review of Systems   Constitutional:  Negative for appetite change, fatigue and unexpected weight change.   HENT:  Negative for trouble swallowing.    Eyes:  Negative for visual disturbance.   Respiratory:  Negative for shortness of breath.    Cardiovascular:  Negative for chest pain.   Gastrointestinal:  Negative for nausea.   Endocrine: Positive for polydipsia and polyphagia. Negative for polyuria.   Genitourinary:         No Nocturia currently   Musculoskeletal:  Positive for myalgias.        R sided lower back pain   Skin:  Negative for wound.   Neurological:  Negative for numbness.       Physical Exam   Physical Exam  Vitals and nursing note reviewed.   Constitutional:       Appearance: She is well-developed.   HENT:      Head: Normocephalic and atraumatic.      Right Ear: External ear normal.      Left Ear: External ear normal.      Nose: Nose normal.   Neck:      Thyroid: No thyromegaly.      Trachea: No tracheal deviation.   Cardiovascular:      Rate and Rhythm: Normal rate and regular rhythm.      Heart sounds: No murmur heard.  Pulmonary:      Effort: Pulmonary effort is normal. No respiratory distress.      Breath sounds: Normal breath sounds.   Abdominal:      Palpations: Abdomen  is soft.      Tenderness: There is no abdominal tenderness.      Hernia: No hernia is present.   Musculoskeletal:      Cervical back: Normal range of motion and neck supple.   Skin:     General: Skin is warm and dry.      Capillary Refill: Capillary refill takes less than 2 seconds.      Findings: No rash.      Comments: Injection sites are normal appearing. No lipo hypertropthy or atrophy     Neurological:      Mental Status: She is alert and oriented to person, place, and time.      Cranial Nerves: No cranial nerve deficit.   Psychiatric:         Behavior: Behavior normal.         Judgment: Judgment normal.         FOOT EXAMINATION: Appropriate footwear     Protective Sensation (w/ 10 gram monofilament):  Right: Intact  Left: Intact    Visual Inspection:  Normal -  Bilateral and Nails Intact - without Evidence of Foot Deformity- Bilateral    Pedal Pulses:   Right: Present  Left: Present    Posterior Tibialis Pulses:   Right:Present  Left: Present        Lab Results   Component Value Date    TSH 5.23 (H) 07/09/2025           Type 2 diabetes mellitus with hyperglycemia, with long-term current use of insulin  Uncontrolled  + prandial excursions  She is interested in insulin pump  Brochures provided  Will have her follow up with diabetes education for a full insulin pump evaluation  Basal heavy on her insulin      Rule out Xiao type 1 labs         -- Medication Changes:   Stop the Lantus   Change to Toujeo just ONCE A DAY   Start with Toujeo 30 units daily     Adjust the Novolog to 10 units before meals   If you skip a meal skip the Novolog     With using correction scale: before meals only - 4 hours since eating   150-200: +1 unit  201-250: +2 units  251-300: +3 units  301-350: +4 units  >350: +5 units    Continue the dexcom G7     Meet with laura to discuss pump options       Addendum--7/23/2025--C-peptide level within normal limits  JOSE pending   Significant proteinurea seen on urine results   Prescription for  losartan 25 mg sent over to pharmacy  At next visit will discuss SGLT2         -- Reviewed goals of therapy are to get the best control we can without hypoglycemia.  -- Reviewed patient's current insulin regimen. Clarified proper insulin dose and timing in relation to meals, etc. Insulin injection sites and proper rotation instructed.    -- Advised frequent self blood glucose monitoring.  Patient encouraged to document glucose results and bring them to every clinic visit. ---continue Dexcom G7 -- supplies from pharmacy   -- Hypoglycemia precautions discussed. Instructed on precautions before driving.    -- Call for Bg repeatedly < 70 or > 180.   -- Close adherence to lifestyle changes recommended.   -- Periodic follow ups for eye evaluations, foot care and dental care suggested.  -- Refer to diabetes education--- Schedule with laura for insulin pump evaluation         Patient has diabetes mellitus and manages diabetes with intensive insulin regimen and uses prandial and basal insulin daily  Patient requires a therapeutic CGM and is willing to use therapeutic CGM for the necessary frequent adjustments of insulin therapy.  I have completed an in-person visit during the previous 6 months and will continue to have in-person visits every 6 months to assess adherence to their CGM regimen and diabetes treatment plan.  Due to COVID pandemic and need for remote monitoring this tool is medically necessary        Pancreas divisum of native pancreas  Check c-peptide   Avoiding GLP1 --concern of possible history of pancreatitis?    Hepatocellular carcinoma  Following with Oncology  Long-term goal of getting a liver transplant    Fatty liver  Optimize BG readings.   See above.       Lesion of liver with high risk for malignant neoplasm  Following with Oncology    Overweight (BMI 25.0-29.9)  Body mass index is 27.05 kg/m².  Increases insulin resistance.   Discussed DM diet and exercise.         I spent a total of 60 minutes on the  day of the visit.This includes face to face time and non-face to face time preparing to see the patient (eg, review of tests), obtaining and/or reviewing separately obtained history, documenting clinical information in the electronic or other health record, independently interpreting results and communicating results to the patient/family/caregiver, or care coordinator.        Follow up in about 3 months (around 10/22/2025).  1. Labs today   2. Schedule with laura for insulin pump evaluation   3. Follow up with me in 3 months with labs prior         Orders Placed This Encounter   Procedures    C-Peptide     Standing Status:   Future     Number of Occurrences:   1     Expected Date:   7/22/2025     Expiration Date:   1/22/2027     Send normal result to authorizing provider's In Basket if patient is active on MyChart::   Yes    Glutamic Acid Decarboxylase     Standing Status:   Future     Number of Occurrences:   1     Expected Date:   7/22/2025     Expiration Date:   10/20/2026     Send normal result to authorizing provider's In Basket if patient is active on MyChart::   Yes    Microalbumin/Creatinine Ratio, Urine     Standing Status:   Future     Number of Occurrences:   1     Expected Date:   7/22/2025     Expiration Date:   1/22/2027     Specimen Source:   Urine    Glucose, Random     Standing Status:   Future     Number of Occurrences:   1     Expected Date:   7/22/2025     Expiration Date:   1/22/2027     Send normal result to authorizing provider's In Basket if patient is active on MyChart::   Yes    Hemoglobin A1C     Standing Status:   Future     Expected Date:   10/22/2025     Expiration Date:   1/22/2027    Comprehensive Metabolic Panel     Standing Status:   Future     Expected Date:   10/22/2025     Expiration Date:   1/22/2027    TSH     Standing Status:   Future     Expected Date:   10/22/2025     Expiration Date:   1/22/2027    Lipid Panel     Standing Status:   Future     Expected Date:   10/22/2025      Expiration Date:   1/22/2027    Fructosamine     Standing Status:   Future     Expected Date:   10/22/2025     Expiration Date:   1/22/2027    Ambulatory referral/consult to Diabetes Education     Standing Status:   Future     Expected Date:   7/22/2025     Expiration Date:   1/22/2027     Referral Priority:   Routine     Referral Type:   Consultation     Referral Reason:   Specialty Services Required     Referred to Provider:   Samantha Alegria RD, [facility]     Requested Specialty:   Diabetes     Number of Visits Requested:   1       Recommendations were discussed with the patient in detail  The patient verbalized understanding and agrees with the plan outlined as above.     This note was partly generated with [employer] voice recognition software. I apologize for any possible typographical errors.

## 2025-07-22 NOTE — ASSESSMENT & PLAN NOTE
Body mass index is 27.05 kg/m².  Increases insulin resistance.   Discussed DM diet and exercise.

## 2025-07-22 NOTE — ASSESSMENT & PLAN NOTE
Uncontrolled  + prandial excursions  She is interested in insulin pump  Brochures provided  Will have her follow up with diabetes education for a full insulin pump evaluation  Basal heavy on her insulin      Rule out Xiao type 1 labs         -- Medication Changes:   Stop the Lantus   Change to Toujeo just ONCE A DAY   Start with Toujeo 30 units daily     Adjust the Novolog to 10 units before meals   If you skip a meal skip the Novolog     With using correction scale: before meals only - 4 hours since eating   150-200: +1 unit  201-250: +2 units  251-300: +3 units  301-350: +4 units  >350: +5 units    Continue the dexcom G7     Meet with laura to discuss pump options       Addendum--7/23/2025--C-peptide level within normal limits  JOSE pending   Significant proteinurea seen on urine results   Prescription for losartan 25 mg sent over to pharmacy  At next visit will discuss SGLT2         -- Reviewed goals of therapy are to get the best control we can without hypoglycemia.  -- Reviewed patient's current insulin regimen. Clarified proper insulin dose and timing in relation to meals, etc. Insulin injection sites and proper rotation instructed.    -- Advised frequent self blood glucose monitoring.  Patient encouraged to document glucose results and bring them to every clinic visit. ---continue Dexcom G7 -- supplies from pharmacy   -- Hypoglycemia precautions discussed. Instructed on precautions before driving.    -- Call for Bg repeatedly < 70 or > 180.   -- Close adherence to lifestyle changes recommended.   -- Periodic follow ups for eye evaluations, foot care and dental care suggested.  -- Refer to diabetes education--- Schedule with laura for insulin pump evaluation         Patient has diabetes mellitus and manages diabetes with intensive insulin regimen and uses prandial and basal insulin daily  Patient requires a therapeutic CGM and is willing to use therapeutic CGM for the necessary frequent adjustments of insulin  therapy.  I have completed an in-person visit during the previous 6 months and will continue to have in-person visits every 6 months to assess adherence to their CGM regimen and diabetes treatment plan.  Due to COVID pandemic and need for remote monitoring this tool is medically necessary

## 2025-07-28 ENCOUNTER — TELEPHONE (OUTPATIENT)
Dept: DIABETES | Facility: CLINIC | Age: 40
End: 2025-07-28
Payer: COMMERCIAL

## 2025-07-29 ENCOUNTER — CLINICAL SUPPORT (OUTPATIENT)
Dept: DIABETES | Facility: CLINIC | Age: 40
End: 2025-07-29
Payer: COMMERCIAL

## 2025-07-29 DIAGNOSIS — E11.65 TYPE 2 DIABETES MELLITUS WITH HYPERGLYCEMIA, WITH LONG-TERM CURRENT USE OF INSULIN: Primary | ICD-10-CM

## 2025-07-29 DIAGNOSIS — Z79.4 TYPE 2 DIABETES MELLITUS WITH HYPERGLYCEMIA, WITH LONG-TERM CURRENT USE OF INSULIN: Primary | ICD-10-CM

## 2025-07-29 PROCEDURE — 99999 PR PBB SHADOW E&M-EST. PATIENT-LVL III: CPT | Mod: PBBFAC,,, | Performed by: DIETITIAN, REGISTERED

## 2025-07-29 PROCEDURE — G0108 DIAB MANAGE TRN  PER INDIV: HCPCS | Mod: S$GLB,,, | Performed by: DIETITIAN, REGISTERED

## 2025-07-31 VITALS — HEIGHT: 60 IN | WEIGHT: 138 LBS | BODY MASS INDEX: 27.09 KG/M2

## 2025-07-31 NOTE — PROGRESS NOTES
Diabetes Care Specialist Progress Note  Author: Samantha Alegria RD, [facility]  Date: 7/31/2025    Intake    Program Intake  Reason for Diabetes Program Visit:: Initial Diabetes Assessment  Type of Intervention:: Individual  Individual: Education  Education: Insulin Pump Evaluation  Current diabetes risk level:: low (HgbA1c 7.7)  In the last month, have you used the ER or been admitted to the hospital: No  Permission to speak with others about care:: no    Current Diabetes Treatment: Insulin  Method of insulin delivery?: Injections  Injection Type: Pens  Pen Type/Dose: novolog 10 units plus sliding scale before meals and toujeo 30 units once a day    Continuous Glucose Monitoring  Patient has CGM: Yes  Personal CGM type:: Dexcom  GMI Date: 07/31/25  GMI Value: 8.1 %    Lab Results   Component Value Date    HGBA1C 7.7 (H) 05/08/2025       Weight: 62.6 kg (138 lb)   Height: 5' (152.4 cm)   Body mass index is 26.95 kg/m².    Lifestyle Coping Support & Clinical    Lifestyle/Coping/Support  Compared to other people your age, how would you rate your health?: Excellent  Does anyone in your family have diabetes or does anyone in your family support you in your diabetes care?: FH: father and siblings  List anything about Diabetes that causes you stress?: doesn't let it bother her  How do you deal with stress/distress?: talks through it  Learning Barriers:: None  Culture or Catholic beliefs that may impact ability to access healthcare: Yes  Psychosocial/Coping Skills Assessment Completed: : Yes  Assessment indicates:: Adequate understanding  Area of need?: No    Problem Review  Active Comorbidities: Gastrointestinal Disorder, Liver Disease, Cancer, Mental Health Condition(s) (ADHD)    Diabetes Self-Management Skills Assessment    Medication Skills Assessment  Patient is able to identify current diabetes medications, dosages, and appropriate timing of medications.: yes  Patient reports problems or concerns with current medication  regimen.: no  Patient is  aware that some diabetes medications can cause low blood sugar?: Yes  Medication Skills Assessment Completed:: Yes  Assessment indicates:: Instruction Needed  Area of need?: Yes    Diabetes Disease Process/Treatment Options  Diabetes Type?: Type II  When were you diagnosed?: 2023  If previous diabetes education, when/where:: no  What are your goals for this education session?: discuss insulin pumps  Is patient aware of what causes diabetes?: Yes  Does patient understand the pathophysiology of diabetes?: Yes  Diabetes Disease Process/Treatment Options: Skills Assessment Completed: Yes  Assessment indicates:: Adequate understanding  Area of need?: No    Nutrition/Healthy Eating  Meal Plan 24 Hour Recall - Breakfast: skipped  Meal Plan 24 Hour Recall - Lunch: something like baked chicken or fish or steak or soup, 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 just grab something that they are serving like a   Meal Plan 24 Hour Recall - Dinner: something like baked chicken or fish or steak or soup, 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 just grab something that they are serving like a   Meal Plan 24 Hour Recall - Snack: grazing all day, nuts, beef jerky sticks, fruits  Meal Plan 24 Hour Recall - Beverage: water, coke zero, unsweet matcha  Who shops/cooks?: self  Patient can identify foods that impact blood sugar.: yes  Challenges to healthy eating:: eating out, going to parties  Nutrition/Healthy Eating Skills Assessment Completed:: Yes  Assessment indicates:: Instruction Needed, Adequate understanding  Area of need?: Yes    Physical Activity/Exercise  Patient's daily activity level:: moderately active  Patient formally exercises outside of work.: yes  Frequency:  (not consistent, 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)  Patient can identify forms of physical activity.: yes  Physical Activity/Exercise Skills Assessment Completed: : Yes  Assessment indicates::  Adequate understanding  Area of need?: No    Home Blood Glucose Monitoring  Patient states that blood sugar is checked at home daily.: yes  Monitoring Method:: personal continuous glucose monitor  Personal CGM type:: Dexcom   What is your current Time in Range?: 40%  What is your A1c Target?: 7.0  Home Blood Glucose Monitoring Skills Assessment Completed: : Yes  Assessment indicates:: Adequate understanding  Area of need?: No        Acute Complications  Have you ever had hypoglycemia (low BG 70 or less)?: no  Do you know the symptoms of low blood sugar and how to treat?: yes  Have you ever had hyperglycemia (high  or more)?: no   Do you know the symptoms of high blood sugar and how to treat: yes  Have you ever had DKA?: no  Do you ever test for ketones?: no  Do you have a sick day plan?: no  Acute Complications Skills Assessment Completed: : Yes  Assessment indicates:: Instruction Needed, Adequate understanding  Area of need?: Yes    Chronic Complications  Reviewed health maintenance: yes  Have you completed your annual diabetes maintenance labwork? : yes  Do you examine your feet daily?: yes  Has your doctor examined your feet?: yes  Do you see a Dentist?: yes  Dentist date of last visit:: needs to make an appt  Do you see an eye doctor?: yes  Eye doctor date of last visit:: needs to make an appt  Chronic Complications Skills Assessment Completed: : Yes  Assessment indicates:: Adequate understanding  Area of need?: No      Assessment Summary and Plan    Based on today's diabetes care assessment, the following areas of need were identified:      Identified Areas of Need      Medication/Current Diabetes Treatment: Yes, Insulin Pump Education  Discussed Medtronic 780G series Pump and the Guardian Sensor; Tandem T-Slim and control IQ; Tandem Mobi; Beta Bionics; and OmniPod Dash /OmniPod 5 Automated mode.    Patient educated on insulin pump therapy, the purpose of insulin pump therapy, advantages and disadvantages  of insulin pump therapy and/vs multiple daily injections, what a basal rate and bolus dose are, when to change infusion site and tubing, demonstrated how to prepare the syringe/cartridge and tubing (except for OmniPod) for use in the pump  Discussed ease of usage, infusion sets, communication with the sensor, basal and bolus, carbohydrate to insulin ration, correction factors, approved areas to insert set, carbohydrate counting, error messages, how to disconnect and reconnect the cartridge and tubing   Patient instructed that based on current insulin regimen she would be changing infusion set daily.  Patient is interested in insulin pump therapy: Omnipod 5.     Insulin Pump Evaluation Check List:      Introduction to Insulin Pump Therapy:  Reviewed basics of insulin pump therapy   Introduced and discussed common terms such as: ISF/Correction Factor, CHO ratio, Goal BG, Bolus, Basal Rate, Active Insulin Time, and Insulin on Board.     Explained the importance of learning to calculate prandial insulin dose/Bolus using advanced carbohydrate counting (Insulin to Carbohydrate Ratio) and Correction Dose (Sensitivity/Correction Factor) prior to starting pump therapy   Discussed basic problem-solving skills for responding to hypo and/or hyperglycemia with regard to insulin pump therapy.   Reviewed patient responsibilities and expectations for insulin pump approval by provider and insurance company such as: SMBG 4 or more times per day, completing detailed insulin pump logs and bringing them to all provider and educator visits, and additional labs needed.   Encouraged patient to check with their insurance provider regarding information such as possible costs associated with initial and monthly pump costs.      Carbohydrate Counting Skills Evaluation:   Reviewed basic Carbohydrate Counting principles--Food groups, label reading, use of dry measuring cups for accurate portion sizes.   Provided online and [place]-based resources to  "help with counting carbohydrates.      Insulin Pump Options:   Reviewed major insulin pump types:   Medtronic  Tandem: T-slim X 2, Mobi  Insulet:  Omnipod Dash, 5  Beta Bionics iLet pump  Discussed unique features of each pump.   Patient was able to view and practice actual device use--using demo pumps and virtual pumps  Discussed the integration of CGM into the pumps to provide automatic adjustments based on the CGM data    Hypoglycemia:   Reviewed the "Rule of 15" and requested  to only use glucose tabs to correct   Instructed to always follow with a meal or protein rich snack after treating lows   Plan to follow at meal time  Discussed issue of weight gain for excessive correction of hypoglycemia.    Hyperglycemia:   Discussion about DKA And potential harm for long term complications.   Discussed problem solving and tx of hyperglycemia, try to identify reason (food, forgot insulin, sick), correct if > 4 hrs since last Bolus, increase CBG testing to q 2 hrs, increase fluid intake, checking ketones.   Discussed sick-day And to notify provider for BG > 300 x 2 and positive ketones  If patient chooses insulin pump therapy she will go with the omnipod 5   Lifestyle Coping/Support: No   Diabetes Disease Process/Treatment Options: No   Nutrition/Healthy Eating: Yes, Reviewed carb counting, portion control, importance of spacing meals throughout the day to prevent post prandial elevations.  Recommended low saturated fat, low sodium diet to aid in control of hypertension and cholesterol. Reviewed plate method and portion control, dining out tips, meal planning, reading a food label, healthy snack options, benefits of physical activity   Physical Activity/Exercise: No    Home Blood Glucose Monitoring: No    Acute Complications: Yes, Reviewed blood glucose goals, prevention, detection, signs and symptoms, and treatment of hypoglycemia and hyperglycemia, and when to contact the clinic.   Chronic Complications: No "       Today's interventions were provided through individual discussion, instruction, and written materials were provided.      Patient verbalized understanding of instruction and written materials.  Pt was able to return back demonstration of instructions today. Patient understood key points, needs reinforcement and further instruction.     Diabetes Self-Management Care Plan:    Today's Diabetes Self-Management Care Plan was developed with Gisella's input. Gisella has agreed to work toward the following goal(s) to improve his/her overall diabetes control.      Care Plan: Diabetes Management   Updates made since 7/31/2024 12:00 AM        Problem: Chronic Complications         Goal: Patient agrees to have the following diabetes ciarra maintenance screenings/appointments eye exam completed in the next 5 months.    Start Date: 7/29/2025   Expected End Date: 12/31/2025   This Visit's Progress: Deferred   Priority: High   Barriers: Lack of Motivation to Change        Task: Discussed current A1c, Blood Pressure, and Cholesterol results (ABCs of Diabetes) and reviewed targets of each. Completed 7/31/2025        Task: Discussed importance of annual microalbumin urine test to monitor kidney function. Completed 7/31/2025        Task: Discussed importance of annual dilated eye exam for prevention of retinopathy. Completed 7/31/2025        Task: Discussed the importance of routine dental exams for the prevention of gum disease and gingivitis and encouraged dental exam every 6 months. Completed 7/31/2025        Task: Instructed on basic daily foot care and encouraged inspecting feet daily. Completed 7/31/2025        Task: Discussed importance of the Flu and Pneumonia Vaccination. Completed 7/31/2025          Follow Up Plan     Follow up in about 3 months (around 10/29/2025) for General Follow-up.    Today's care plan and follow up schedule was discussed with patient.  Gisella verbalized understanding of the care plan, goals, and  agrees to follow up plan.        The patient was encouraged to communicate with his/her health care provider/physician and care team regarding his/her condition(s) and treatment.  I provided the patient with my contact information today and encouraged to contact me via phone or [facility]'s Patient Portal as needed.     Length of Visit   Total Time: 65 Minutes

## 2025-08-01 ENCOUNTER — TELEPHONE (OUTPATIENT)
Dept: DIABETES | Facility: CLINIC | Age: 40
End: 2025-08-01
Payer: COMMERCIAL